# Patient Record
Sex: FEMALE | Race: OTHER | URBAN - METROPOLITAN AREA
[De-identification: names, ages, dates, MRNs, and addresses within clinical notes are randomized per-mention and may not be internally consistent; named-entity substitution may affect disease eponyms.]

---

## 2019-06-28 ENCOUNTER — APPOINTMENT (RX ONLY)
Dept: URBAN - METROPOLITAN AREA CLINIC 59 | Facility: CLINIC | Age: 69
Setting detail: DERMATOLOGY
End: 2019-06-28

## 2019-06-28 DIAGNOSIS — Z41.9 ENCOUNTER FOR PROCEDURE FOR PURPOSES OTHER THAN REMEDYING HEALTH STATE, UNSPECIFIED: ICD-10-CM

## 2019-06-28 PROCEDURE — ? CHEMICAL PEEL

## 2019-06-28 NOTE — PROCEDURE: CHEMICAL PEEL
Prep: The treated area was cleansed, degreased with acetone, and vaseline was applied for protection of mucous membranes.
Treatment Number: 0
Detail Level: Zone
Price (Use Numbers Only, No Special Characters Or $): 175.00
Post Peel Care: After the procedure, post instructions reviewed at length.
Chemical Peel: Skin Medica Rejuvenize
Consent: Prior to the procedure, written consent was obtained and risks were reviewed, including but not limited to: redness, peeling, blistering, pigmentary change, scarring, infection, and pain.
Post-Care Instructions: I reviewed with the patient in detail post-care instructions. Patient should avoid sun exposure and wear sun protection.
Number Of Layers: 3

## 2019-07-17 ENCOUNTER — APPOINTMENT (RX ONLY)
Dept: URBAN - METROPOLITAN AREA CLINIC 59 | Facility: CLINIC | Age: 69
Setting detail: DERMATOLOGY
End: 2019-07-17

## 2019-07-17 DIAGNOSIS — Z41.9 ENCOUNTER FOR PROCEDURE FOR PURPOSES OTHER THAN REMEDYING HEALTH STATE, UNSPECIFIED: ICD-10-CM

## 2019-07-17 PROCEDURE — ? JUVEDERM VOLUMA XC INJECTION

## 2019-07-17 PROCEDURE — ? ADDITIONAL NOTES

## 2019-07-17 PROCEDURE — ? BOTOX

## 2019-07-17 ASSESSMENT — LOCATION DETAILED DESCRIPTION DERM
LOCATION DETAILED: LEFT LATERAL EYEBROW
LOCATION DETAILED: RIGHT CENTRAL EYEBROW
LOCATION DETAILED: RIGHT INFERIOR MEDIAL FOREHEAD
LOCATION DETAILED: GLABELLA
LOCATION DETAILED: RIGHT LATERAL EYEBROW
LOCATION DETAILED: LEFT INFERIOR MEDIAL FOREHEAD

## 2019-07-17 ASSESSMENT — LOCATION SIMPLE DESCRIPTION DERM
LOCATION SIMPLE: LEFT EYEBROW
LOCATION SIMPLE: RIGHT FOREHEAD
LOCATION SIMPLE: GLABELLA
LOCATION SIMPLE: LEFT FOREHEAD
LOCATION SIMPLE: RIGHT EYEBROW

## 2019-07-17 ASSESSMENT — LOCATION ZONE DERM: LOCATION ZONE: FACE

## 2019-07-17 NOTE — PROCEDURE: BOTOX
Lateral Platysmal Bands Units: 0
Consent: Written consent obtained. Risks include but not limited to lid/brow ptosis, bruising, swelling, diplopia, temporary effect, incomplete chemical denervation.
Additional Area 6 Location: platysmal bands medial
Dilution (U/0.1 Cc): 2
Additional Area 5 Location: armando
Glabellar Complex Units: 24
Post-Care Instructions: Patient instructed to not lie down for 4 hours and limit physical activity for 24 hours. Patient instructed not to travel by airplane for 48 hours.
Additional Area 4 Location: lip
Additional Area 2 Location: left tail brow
Detail Level: Zone
Additional Area 1 Location: right tail brow
Lot #: [id number]
Expiration Date (Month Year): 11/2021

## 2019-07-17 NOTE — PROCEDURE: JUVEDERM VOLUMA XC INJECTION
Post-Care Instructions: Patient instructed to apply ice to reduce swelling.
Mid Face Filler  Volume In Cc: 0
Expiration Date (Month Year): 2020/10/31
Topical Anesthesia?: BLT cream (benzocaine 20%, lidocaine 6%, tetracaine 4%)
Include Cannula Information In Note?: Yes
Filler: Juvederm Voluma XC
Include Cannula Brand?: DermaSculpt
Additional Area 1 Location: right side
Detail Level: Detailed
Anesthesia Type: 1% lidocaine with epinephrine
Include Cannula Length?: 1 inch
Map Statment: See Attach Map for Complete Details
Procedural Text: The filler was administered to the treatment areas noted above.
Anesthesia Volume In Cc: 0.5
Include Cannula Size?: 27G
Additional Area 2 Location: left side
Lot #: [id number]
Consent: Written consent obtained. Risks include but not limited to bruising, beading, irregular texture, ulceration, infection, allergic reaction, scar formation, incomplete augmentation, temporary nature, procedural pain.

## 2019-07-17 NOTE — PROCEDURE: ADDITIONAL NOTES
Detail Level: Simple
Additional Notes: 2 voluma syringes\\n27g1/2” needle aliquot \\nTemples\\nRight\\nLeft\\nBTMC 27g1”\\nZygomatic arch \\nRight\\nLeft

## 2019-08-12 ENCOUNTER — APPOINTMENT (RX ONLY)
Dept: URBAN - METROPOLITAN AREA CLINIC 59 | Facility: CLINIC | Age: 69
Setting detail: DERMATOLOGY
End: 2019-08-12

## 2019-08-12 DIAGNOSIS — Z41.9 ENCOUNTER FOR PROCEDURE FOR PURPOSES OTHER THAN REMEDYING HEALTH STATE, UNSPECIFIED: ICD-10-CM

## 2019-08-12 PROCEDURE — ? JUVEDERM VOLUMA XC INJECTION

## 2019-08-12 PROCEDURE — ? JUVEDERM ULTRA PLUS XC INJECTION

## 2019-08-12 PROCEDURE — ? BOTOX

## 2019-08-12 PROCEDURE — ? ADDITIONAL NOTES

## 2019-08-12 ASSESSMENT — LOCATION SIMPLE DESCRIPTION DERM
LOCATION SIMPLE: CHIN
LOCATION SIMPLE: LEFT CHEEK
LOCATION SIMPLE: RIGHT LIP
LOCATION SIMPLE: RIGHT CHEEK
LOCATION SIMPLE: LEFT LIP

## 2019-08-12 ASSESSMENT — LOCATION DETAILED DESCRIPTION DERM
LOCATION DETAILED: RIGHT UPPER CUTANEOUS LIP
LOCATION DETAILED: LEFT UPPER CUTANEOUS LIP
LOCATION DETAILED: LEFT CENTRAL MALAR CHEEK
LOCATION DETAILED: RIGHT LOWER CUTANEOUS LIP
LOCATION DETAILED: LEFT CHIN
LOCATION DETAILED: LEFT LOWER CUTANEOUS LIP
LOCATION DETAILED: RIGHT CENTRAL MALAR CHEEK

## 2019-08-12 ASSESSMENT — LOCATION ZONE DERM
LOCATION ZONE: LIP
LOCATION ZONE: FACE

## 2019-08-12 NOTE — PROCEDURE: JUVEDERM ULTRA PLUS XC INJECTION
Anesthesia Type: 1% lidocaine with epinephrine
Vermilion Lips Filler Volume In Cc: 0
Map Statment: See Attach Map for Complete Details
Lot #: [id number]
Additional Anesthesia Volume In Cc: 0.1
Procedural Text: The filler was administered to the treatment areas noted above.
Additional Area 4 Location: lower verm border
Include Cannula Size?: 30G
Additional Area 2 Location: perioral lines
Consent: Written consent obtained. Risks include but not limited to bruising, beading, irregular texture, ulceration, infection, allergic reaction, scar formation, incomplete augmentation, temporary nature, procedural pain.
Topical Anesthesia?: BLT cream (benzocaine 20%, lidocaine 6%, tetracaine 4%)
Anesthesia Volume In Cc: 0.5
Post-Care Instructions: Patient instructed to apply ice to reduce swelling.
Include Cannula Information In Note?: Yes
Expiration Date (Month Year): 2020/08/11
Additional Anesthesia Type: 1% lidocaine with 1:100,000 epinephrine, 1% lidocaine without epinephrine and 8.4% sodium bicarbonate in a 2:2:1 Ratio
Detail Level: Detailed
Additional Area 5 Location: nose tip
Include Cannula Brand?: DermaSculpt
Additional Area 3 Location: upper verm border
Include Cannula Length?: 1 inch
Filler: Juvederm Ultra Plus XC
Additional Area 1 Location: oral comms

## 2019-08-12 NOTE — PROCEDURE: JUVEDERM VOLUMA XC INJECTION
Additional Area 1 Volume In Cc: 0
Procedural Text: The filler was administered to the treatment areas noted above.
Use Map Statement For Sites (Optional): Yes
Lot #: [id number]
Anesthesia Volume In Cc: 0.5
Additional Area 2 Location: left side
Topical Anesthesia?: BLT cream (benzocaine 20%, lidocaine 6%, tetracaine 4%)
Consent: Written consent obtained. Risks include but not limited to bruising, beading, irregular texture, ulceration, infection, allergic reaction, scar formation, incomplete augmentation, temporary nature, procedural pain.
Include Cannula Size?: 27G
Post-Care Instructions: Patient instructed to apply ice to reduce swelling.
Expiration Date (Month Year): 2020/10/31
Filler: Juvederm Voluma XC
Anesthesia Type: 1% lidocaine with epinephrine
Additional Area 1 Location: right side
Map Statment: See Attach Map for Complete Details
Include Cannula Brand?: DermaSculpt
Include Cannula Length?: 1 inch
Detail Level: Detailed

## 2019-08-12 NOTE — PROCEDURE: ADDITIONAL NOTES
Detail Level: Simple
Additional Notes: 1 Voluma syringe \\nZygomatic arch \\nBtmc 27g1”
Additional Notes: 1 juvederm ultra plus syringe \\nBtmc 30g1”\\nPerioral \\nUpper verm border \\nLower verm border \\nOral comms

## 2019-08-12 NOTE — PROCEDURE: BOTOX
Forehead Units: 0
Additional Area 4 Location: tmj left
Expiration Date (Month Year): 01/2022
Lot #: [id number]
Consent: Written consent obtained. Risks include but not limited to lid/brow ptosis, bruising, swelling, diplopia, temporary effect, incomplete chemical denervation.
Detail Level: Zone
Additional Area 5 Location: tmj right
Additional Area 1 Location: right tail brow
Additional Area 2 Location: left tail brow
Additional Area 3 Location: oral comms
Additional Area 3 Units: 4
Additional Area 6 Location: platysmal bands medial
Dilution (U/0.1 Cc): 2
Post-Care Instructions: Patient instructed to not lie down for 4 hours and limit physical activity for 24 hours. Patient instructed not to travel by airplane for 48 hours.